# Patient Record
Sex: FEMALE | Race: WHITE
[De-identification: names, ages, dates, MRNs, and addresses within clinical notes are randomized per-mention and may not be internally consistent; named-entity substitution may affect disease eponyms.]

---

## 2020-10-05 ENCOUNTER — HOSPITAL ENCOUNTER (EMERGENCY)
Dept: HOSPITAL 60 - LB.ED | Age: 60
Discharge: HOME | End: 2020-10-05
Payer: COMMERCIAL

## 2020-10-05 DIAGNOSIS — S61.512A: Primary | ICD-10-CM

## 2020-10-05 DIAGNOSIS — Z88.5: ICD-10-CM
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
## 2020-10-05 DIAGNOSIS — Z79.899: ICD-10-CM

## 2020-10-05 DIAGNOSIS — Z23: ICD-10-CM
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
## 2020-10-05 NOTE — EDM.PDOC
ED HPI GENERAL MEDICAL PROBLEM





- General


Stated Complaint: LACERATION


Time Seen by Provider: 10/05/20 12:00


Source of Information: Reports: Patient





- History of Present Illness


INITIAL COMMENTS - FREE TEXT/NARRATIVE: 





Patient is a 59 y/o female who presents with laceration to her left wrist.  She 

was using a  at work and accidentally cut herself.  Bleeding 

controlled prior to arrival to the ED.  Last tetanus immunization was in 2014.  





- Related Data


                                    Allergies











Allergy/AdvReac Type Severity Reaction Status Date / Time


 


codeine Allergy  Facial Verified 10/08/18 12:26





   Swelling  











Home Meds: 


                                    Home Meds





Calcium Citrate 200 mg PO DAILY 10/17/13 [History]


Ibuprofen/Pseudoephedrine HCl [Advil Cold & Sinus Caplet] 200 mg PO ASDIRECTED 

12/21/14 [History]











ED ROS GENERAL





- Review of Systems


Review Of Systems: See Below


Constitutional: Reports: No Symptoms


HEENT: Reports: No Symptoms


Musculoskeletal: Reports: No Symptoms


Skin: Reports: Wound





ED EXAM, SKIN/RASH


Exam: See Below


Exam Limited By: No Limitations


General Appearance: Alert, No Apparent Distress


Head: Atraumatic, Normocephalic


Respiratory/Chest: No Respiratory Distress, No Accessory Muscle Use


Extremities: Normal Inspection, Normal Range of Motion, Normal Capillary Refill


Neurological: Alert, Oriented


Skin: Warm, Dry, Wound/Incision, Other (2.0 cm gaping, subcutaneous laceration 

to left wrist)





ED SKIN PROCEDURES





- Laceration/Wound Repair


  ** Left Ventral Wrist


Appearance: Subcutaneous


Distal NVT: Neuro & Vascular Intact, No Tendon Injury


Anesthetic Type: Local


Local Anesthesia - Lidocaine (Xylocaine): 1% Plain


Local Anesthetic Volume: 3cc


Skin Prep: Chlorhexidine (Hibiciens), Saline


Exploration/Debridement/Repair: Wound Explored, No Foreign Material Found


Suture Size: 4-0


# of Sutures: 3


Repaired with: Other (ethilon)


Drain Placement: No


Sterile Dressing Applied: Nurse


Tetanus Status Addressed: Yes


Complications: No





Course





- Vital Signs


Last Recorded V/S: 





                                Last Vital Signs











Temp  36.4 C   10/05/20 12:05


 


Pulse  71   10/05/20 12:05


 


Resp  18   10/05/20 12:05


 


BP  149/88 H  10/05/20 12:05


 


Pulse Ox  99   10/05/20 12:05














- Orders/Labs/Meds


Orders: 





                               Active Orders 24 hr











 Category Date Time Status


 


 Vaccines to be Administered [RC] PER UNIT ROUTINE Care  10/05/20 12:20 Ordered











Meds: 





Medications














Discontinued Medications














Generic Name Dose Route Start Last Admin





  Trade Name Leno  PRN Reason Stop Dose Admin


 


Diphtheria/Tetanus/Acell Pertussis  0.5 ml  10/05/20 12:20 





  Boostrix  IM  10/05/20 12:21 





  .ONCE ONE  














Departure





- Departure


Time of Disposition: 12:00


Disposition: Home, Self-Care 01


Clinical Impression: 


 Laceration








- Discharge Information


*PRESCRIPTION DRUG MONITORING PROGRAM REVIEWED*: Not Applicable


*COPY OF PRESCRIPTION DRUG MONITORING REPORT IN PATIENT MAURO: Not Applicable





Sepsis Event Note (ED)





- Evaluation


Sepsis Screening Result: No Definite Risk





- Focused Exam


Vital Signs: 





                                   Vital Signs











  Temp Pulse Resp BP Pulse Ox


 


 10/05/20 12:05  36.4 C  71  18  149/88 H  99














- My Orders


Last 24 Hours: 





My Active Orders





10/05/20 12:20


Vaccines to be Administered [RC] PER UNIT ROUTINE 














- Assessment/Plan


Last 24 Hours: 





My Active Orders





10/05/20 12:20


Vaccines to be Administered [RC] PER UNIT ROUTINE

## 2021-04-18 ENCOUNTER — HOSPITAL ENCOUNTER (EMERGENCY)
Dept: HOSPITAL 60 - LB.ED | Age: 61
Discharge: NURSING, REHAB OR LONG-TERM CARE | End: 2021-04-18
Payer: COMMERCIAL

## 2021-04-18 DIAGNOSIS — N30.01: ICD-10-CM

## 2021-04-18 DIAGNOSIS — Z88.5: ICD-10-CM

## 2021-04-18 DIAGNOSIS — K35.80: Primary | ICD-10-CM

## 2021-04-18 DIAGNOSIS — Z79.899: ICD-10-CM

## 2021-04-18 PROCEDURE — A0429 BLS-EMERGENCY: HCPCS

## 2021-04-18 PROCEDURE — U0002 COVID-19 LAB TEST NON-CDC: HCPCS

## 2021-04-18 PROCEDURE — A0425 GROUND MILEAGE: HCPCS

## 2021-04-18 NOTE — EDM.PDOC
ED HPI GENERAL MEDICAL PROBLEM





- General


Chief Complaint: Abdominal Pain


Stated Complaint: abd pain


Time Seen by Provider: 04/18/21 19:10


Source of Information: Reports: Patient


History Limitations: Reports: No Limitations





- History of Present Illness


INITIAL COMMENTS - FREE TEXT/NARRATIVE: 





Ms. Nieto is a 60 YOF here for diffuse abdominal pain and nausea that began 

this am. She states it started in the chest and has moved down into her lower 

abdomen. She has not taken anything for her symptoms. No history of choroidal 

melanoma. Abd is soft, she is not guarding, no rebound tenderness. No vomiting. 

Normal HRR, lungs are CTA, no fever or rash noted. 


Onset: Today


Onset Date: 04/18/21


Onset Time: 10:00


Duration: Hour(s):, Constant


Location: Reports: Abdomen


Quality: Reports: Ache


Improves with: Reports: None


Worsens with: Reports: None


Associated Symptoms: Reports: No Other Symptoms


  ** Abdominal


Pain Score (Numeric/FACES): 8





- Related Data


                                    Allergies











Allergy/AdvReac Type Severity Reaction Status Date / Time


 


codeine Allergy  Facial Verified 04/18/21 19:14





   Swelling  











Home Meds: 


                                    Home Meds





Calcium Citrate 200 mg PO DAILY 10/17/13 [History]


Ibuprofen/Pseudoephedrine HCl [Advil Cold & Sinus Caplet] 200 mg PO ASDIRECTED 

12/21/14 [History]


Cyclobenzaprine [Flexeril] 10 mg PO BEDTIME 04/18/21 [History]


Escitalopram Oxalate [Lexapro] 20 mg PO DAILY 04/18/21 [History]











ED ROS GENERAL





- Review of Systems


Review Of Systems: See Below


GI/Abdominal: Reports: Abdominal Pain, Nausea





ED EXAM, GI/ABD





- Physical Exam


Exam: See Below


Exam Limited By: No Limitations


General Appearance: Alert, Anxious, Mild Distress


Ears: Normal External Exam, Normal Canal


Nose: Normal Inspection, Normal Mucosa


Throat/Mouth: Normal Inspection, Normal Lips, Normal Teeth, Normal Oropharynx


Head: Atraumatic, Normocephalic


Neck: Normal Inspection, Supple, Non-Tender


Respiratory/Chest: No Respiratory Distress, Lungs Clear, Normal Breath Sounds


Cardiovascular: Normal Peripheral Pulses, Regular Rate, Rhythm, No Edema


GI/Abdominal Exam: Normal Bowel Sounds, Soft, No Distention


 (Female) Exam: Deferred


Rectal (Female) Exam: Deferred


Back Exam: Normal Inspection


Extremities: Normal Inspection, Normal Range of Motion, Non-Tender


Neurological: Alert, Oriented, CN II-XII Intact, Normal Cognition


Psychiatric: Normal Affect, Anxious


Skin Exam: Warm, Dry, Intact, Normal Color


Lymphatic: No Adenopathy


  ** #1 Interpretation


EKG Date: 04/18/21


Time: 18:58


Rhythm: NSR


Rate (Beats/Min): 88


Axis: Normal


P-Wave: Present


QRS: Normal


ST-T: Normal


QT: Normal


Comparison: NA - No Prior EKG


EKG Interpretation Comments: 





Normal sinus rhythm 





Course





- Vital Signs


Last Recorded V/S: 


                                Last Vital Signs











Temp  37.1 C   04/18/21 18:47


 


Pulse  91   04/18/21 19:05


 


Resp  18   04/18/21 19:05


 


BP  162/103 H  04/18/21 19:05


 


Pulse Ox  98   04/18/21 19:05














- Orders/Labs/Meds


Orders: 


                               Active Orders 24 hr











 Category Date Time Status


 


 EKG Documentation Completion [RC] ASDIRECTED Care  04/18/21 19:00 Active


 


 Abdomen Pelvis w Cont [CT] Stat Exams  04/18/21 18:20 Taken


 


 CORONAVIRUS COVID-19 KARIN [MOLEC] Stat Lab  04/18/21 20:24 Received


 


 CULTURE URINE [RM] Stat Lab  04/18/21 20:21 Received


 


 Iopamidol [Isovue-300 (61%)] Med  04/18/21 19:15 Active





 100 ml IV .AS DIRECTED   


 


 Levofloxacin/Dextrose 5%-Water [Levaquin in D5W 750 MG/ Med  04/18/21 19:46 

Active





 150 ML] 750 mg   





 Levofloxacin/Dextrose 5%-Water [Levaquin in D5W 750 MG/   





 150 ML] 150 ml   





 IV ONETIME   


 


 Phenazopyridine [Pyridium] Med  04/18/21 20:18 Active





 200 mg PO BID PRN   


 


 Sodium Chloride 0.9% [Normal Saline] 1,000 ml Med  04/18/21 18:23 Active





 IV .BOLUS   








                                Medication Orders





Sodium Chloride (Normal Saline)  1,000 mls @ 999 drops/hr IV .BOLUS ONE


   Stop: 04/19/21 09:23


   Last Admin: 04/18/21 19:19  Dose: 999 drops/hr


   Documented by: THOR


Levofloxacin/Dextrose 750 mg/ (Levofloxacin/Dextrose)  300 mls @ 100 mls/hr IV 

ONETIME ONE


   Stop: 04/18/21 22:45


   Last Admin: 04/18/21 19:50  Dose: 100 mls/hr


   Documented by: THOR


Iopamidol (Iopamidol 612 Mg/Ml 100 Ml Bottle)  100 ml IV .AS DIRECTED BARBRA


   Last Admin: 04/18/21 19:29  Dose: 100 ml


   Documented by: RACHEL


Phenazopyridine HCl (Phenazopyridine 100 Mg Tab)  200 mg PO BID PRN


   PRN Reason: Abdominal Pain


   Last Admin: 04/18/21 20:42  Dose: 200 mg


   Documented by: THOR








Labs: 


                                Laboratory Tests











  04/18/21 04/18/21 04/18/21 Range/Units





  19:00 19:00 19:00 


 


WBC  12.9 H D    (4.0-11.0)  K/uL


 


RBC  4.33    (3.80-5.80)  M/uL


 


Hgb  13.5    (11.5-16.5)  g/dL


 


Hct  39.4    (37.0-47.0)  %


 


MCV  91    (76-96)  fL


 


MCH  31.2    (27.0-32.0)  pg


 


MCHC  34.3    (31.0-35.0)  g/dL


 


RDW  11.7    (11.0-16.0)  %


 


Plt Count  200    (150-500)  K/uL


 


MPV  8.8    (6.0-10.0)  fL


 


Neut % (Auto)  80.6 H    (45.0-70.0)  %


 


Lymph % (Auto)  10.1 L    (20.0-40.0)  %


 


Mono % (Auto)  8.1    (3.0-10.0)  %


 


Eos % (Auto)  1.0    (1.0-5.0)  %


 


Baso % (Auto)  0.2    (0.0-0.5)  %


 


Neut # (Auto)  10.42 H    (2.00-7.50)  K/uL


 


Lymph # (Auto)  1.30 L    (1.50-4.00)  K/uL


 


Mono # (Auto)  1.05 H    (0.20-0.80)  K/uL


 


Eos # (Auto)  0.13    (0.04-0.40)  K/uL


 


Baso # (Auto)  0.02    (0.02-0.10)  K/uL


 


Sodium   138   (136-145)  mmol/L


 


Potassium   4.8   (3.5-5.1)  mmol/L


 


Chloride   103   ()  mmol/L


 


Carbon Dioxide   24.5   (21.0-32.0)  mmol/L


 


Anion Gap   15.3 H   (5.0-15.0)  mmol/L


 


BUN   13  D   (8-26)  mg/dL


 


Creatinine   0.78   (0.55-1.02)  mg/dL


 


Est Cr Clr Drug Dosing   TNP   


 


Estimated GFR (MDRD)   > 60   (>60)  MLS/MIN


 


BUN/Creatinine Ratio   16.7   (6-25)  


 


Glucose   135 H D   ()  mg/dL


 


Calcium   8.7   (8.5-10.1)  mg/dL


 


Total Bilirubin   0.7   (0.0-1.0)  mg/dL


 


AST   14 L   (15-37)  U/L


 


ALT   23   (12-78)  U/L


 


Alkaline Phosphatase   82   ()  U/L


 


Troponin I    < 0.017  (0.000-0.060)  ng/mL


 


Total Protein   7.5   (6.4-8.2)  g/dL


 


Albumin   4.1   (3.4-5.0)  g/dL


 


Globulin   3.4   (2.2-4.2)  g/dL


 


Albumin/Globulin Ratio   1.2   (0.8-2.0)  


 


Urine Color     


 


Urine Appearance     (CLEAR)  


 


Urine pH     (5.0-8.0)  


 


Ur Specific Gravity     (1.003-1.030)  


 


Urine Protein     (NEGATIVE)  mg/dL


 


Urine Glucose (UA)     (NEGATIVE)  mg/dL


 


Urine Ketones     (NEGATIVE)  mg/dL


 


Urine Occult Blood     (NEGATIVE)  


 


Urine Nitrite     (NEGATIVE)  


 


Urine Bilirubin     (NEGATIVE)  


 


Urine Urobilinogen     (0.2-1.0)  E.U./dL


 


Ur Leukocyte Esterase     (NEGATIVE)  


 


Urine RBC     /HPF


 


Urine WBC     /HPF


 


Urine WBC Clumps     /HPF


 


Ur Squamous Epith Cells     /HPF


 


Urine Bacteria     /HPF














  04/18/21 Range/Units





  19:07 


 


WBC   (4.0-11.0)  K/uL


 


RBC   (3.80-5.80)  M/uL


 


Hgb   (11.5-16.5)  g/dL


 


Hct   (37.0-47.0)  %


 


MCV   (76-96)  fL


 


MCH   (27.0-32.0)  pg


 


MCHC   (31.0-35.0)  g/dL


 


RDW   (11.0-16.0)  %


 


Plt Count   (150-500)  K/uL


 


MPV   (6.0-10.0)  fL


 


Neut % (Auto)   (45.0-70.0)  %


 


Lymph % (Auto)   (20.0-40.0)  %


 


Mono % (Auto)   (3.0-10.0)  %


 


Eos % (Auto)   (1.0-5.0)  %


 


Baso % (Auto)   (0.0-0.5)  %


 


Neut # (Auto)   (2.00-7.50)  K/uL


 


Lymph # (Auto)   (1.50-4.00)  K/uL


 


Mono # (Auto)   (0.20-0.80)  K/uL


 


Eos # (Auto)   (0.04-0.40)  K/uL


 


Baso # (Auto)   (0.02-0.10)  K/uL


 


Sodium   (136-145)  mmol/L


 


Potassium   (3.5-5.1)  mmol/L


 


Chloride   ()  mmol/L


 


Carbon Dioxide   (21.0-32.0)  mmol/L


 


Anion Gap   (5.0-15.0)  mmol/L


 


BUN   (8-26)  mg/dL


 


Creatinine   (0.55-1.02)  mg/dL


 


Est Cr Clr Drug Dosing   


 


Estimated GFR (MDRD)   (>60)  MLS/MIN


 


BUN/Creatinine Ratio   (6-25)  


 


Glucose   ()  mg/dL


 


Calcium   (8.5-10.1)  mg/dL


 


Total Bilirubin   (0.0-1.0)  mg/dL


 


AST   (15-37)  U/L


 


ALT   (12-78)  U/L


 


Alkaline Phosphatase   ()  U/L


 


Troponin I   (0.000-0.060)  ng/mL


 


Total Protein   (6.4-8.2)  g/dL


 


Albumin   (3.4-5.0)  g/dL


 


Globulin   (2.2-4.2)  g/dL


 


Albumin/Globulin Ratio   (0.8-2.0)  


 


Urine Color  Yellow  


 


Urine Appearance  Cloudy  (CLEAR)  


 


Urine pH  6.0  (5.0-8.0)  


 


Ur Specific Gravity  1.025  (1.003-1.030)  


 


Urine Protein  Negative  (NEGATIVE)  mg/dL


 


Urine Glucose (UA)  Negative  (NEGATIVE)  mg/dL


 


Urine Ketones  40 H  (NEGATIVE)  mg/dL


 


Urine Occult Blood  Trace-lysed H  (NEGATIVE)  


 


Urine Nitrite  Positive H  (NEGATIVE)  


 


Urine Bilirubin  Negative  (NEGATIVE)  


 


Urine Urobilinogen  0.2  (0.2-1.0)  E.U./dL


 


Ur Leukocyte Esterase  Small H  (NEGATIVE)  


 


Urine RBC  0-5 H  /HPF


 


Urine WBC  50-75 H  /HPF


 


Urine WBC Clumps  Few  /HPF


 


Ur Squamous Epith Cells  Few  /HPF


 


Urine Bacteria  Many H  /HPF











Meds: 


Medications











Generic Name Dose Route Start Last Admin





  Trade Name Leno  PRN Reason Stop Dose Admin


 


Sodium Chloride  1,000 mls @ 999 drops/hr  04/18/21 18:23  04/18/21 19:19





  Normal Saline  IV  04/19/21 09:23  999 drops/hr





  .BOLUS ONE   Administration


 


Levofloxacin/Dextrose 750 mg/  300 mls @ 100 mls/hr  04/18/21 19:46  04/18/21 

19:50





  Levofloxacin/Dextrose  IV  04/18/21 22:45  100 mls/hr





  ONETIME ONE   Administration


 


Iopamidol  100 ml  04/18/21 19:15  04/18/21 19:29





  Iopamidol 612 Mg/Ml 100 Ml Bottle  IV   100 ml





  .AS DIRECTED BARBRA   Administration


 


Phenazopyridine HCl  200 mg  04/18/21 20:18  04/18/21 20:42





  Phenazopyridine 100 Mg Tab  PO   200 mg





  BID PRN   Administration





  Abdominal Pain  














Discontinued Medications














Generic Name Dose Route Start Last Admin





  Trade Name Leno  PRN Reason Stop Dose Admin


 


Fentanyl  12.5 mcg  04/18/21 19:23  04/18/21 19:29





  Fentanyl 100 Mcg/2 Ml Sdv  IVPUSH  04/18/21 19:24  12.5 mcg





  ONETIME ONE   Administration


 


Hydromorphone HCl  1 mg  04/18/21 20:47 





  Hydromorphone 2 Mg/Ml Sdv  IV  04/18/21 20:48 





  ONETIME ONE  


 


Levofloxacin/Dextrose  Confirm  04/18/21 19:37  04/18/21 20:08





  Levaquin In D5w 750 Mg/150 Ml  Administered  04/18/21 19:38  Not Given





  Dose  





  150 mls @ as directed  





  IV  





  .STK-MED ONE  


 


Sodium Chloride  50 ml  04/18/21 19:10  04/18/21 19:28





  Sodium Chloride 0.9% 50 Ml Sdv  FLUSH  04/18/21 19:11  50 ml





  ONETIME ONE   Administration














Departure





- Departure


Time of Disposition: 21:00


Disposition: DC/Tfer to Acute Hospital [facility]


Condition: Good


Clinical Impression: 


UTI (urinary tract infection)


Qualifiers:


 Urinary tract infection type: acute cystitis Hematuria presence: with hematuria

Qualified Code(s): N30.01 - Acute cystitis with hematuria





Acute appendicitis


Qualifiers:


 Acute appendicitis type: unspecified acute appendicitis type Qualified Code(s):

K35.80 - Unspecified acute appendicitis





Appendicitis


Qualifiers:


 Appendicitis type: acute appendicitis Acute appendicitis type: unspecified 

acute appendicitis type Qualified Code(s): K35.80 - Unspecified acute 

appendicitis








- Discharge Information


*PRESCRIPTION DRUG MONITORING PROGRAM REVIEWED*: Not Applicable


*COPY OF PRESCRIPTION DRUG MONITORING REPORT IN PATIENT MAURO: Not Applicable


Instructions:  Urinary Tract Infection, Adult, Easy-to-Read


Referrals: 


PCP,None [Primary Care Provider] - 


Forms:  ED Department Discharge


Care Plan Goals: 


Transfer to [facility] for appendectomy with General Surgery, discussed care plan 

with Dr. Peters and Dr. Ellis. Transfer from [facility] with ACLS crew. 





Sepsis Event Note (ED)





- Focused Exam


Vital Signs: 


                                   Vital Signs











  Temp Pulse Resp BP Pulse Ox


 


 04/18/21 19:05   91  18  162/103 H  98


 


 04/18/21 18:47  37.1 C  90  18  165/103 H  99














- My Orders


Last 24 Hours: 


My Active Orders





04/18/21 18:20


Abdomen Pelvis w Cont [CT] Stat 





04/18/21 18:23


Sodium Chloride 0.9% [Normal Saline] 1,000 ml IV .BOLUS 





04/18/21 19:00


EKG Documentation Completion [RC] ASDIRECTED 





04/18/21 19:15


Iopamidol [Isovue-300 (61%)]   100 ml IV .AS DIRECTED 





04/18/21 19:46


Levofloxacin/Dextrose 5%-Water [Levaquin in D5W 750 MG/150 ML] 750 mg   

Levofloxacin/Dextrose 5%-Water [Levaquin in D5W 750 MG/150 ML] 150 ml IV ONETIME







04/18/21 20:18


Phenazopyridine [Pyridium]   200 mg PO BID PRN 





04/18/21 20:21


CULTURE URINE [RM] Stat 





04/18/21 20:24


CORONAVIRUS COVID-19 KARIN [MOLEC] Stat 














- Assessment/Plan


Last 24 Hours: 


My Active Orders





04/18/21 18:20


Abdomen Pelvis w Cont [CT] Stat 





04/18/21 18:23


Sodium Chloride 0.9% [Normal Saline] 1,000 ml IV .BOLUS 





04/18/21 19:00


EKG Documentation Completion [RC] ASDIRECTED 





04/18/21 19:15


Iopamidol [Isovue-300 (61%)]   100 ml IV .AS DIRECTED 





04/18/21 19:46


Levofloxacin/Dextrose 5%-Water [Levaquin in D5W 750 MG/150 ML] 750 mg   

Levofloxacin/Dextrose 5%-Water [Levaquin in D5W 750 MG/150 ML] 150 ml IV ONETIME







04/18/21 20:18


Phenazopyridine [Pyridium]   200 mg PO BID PRN 





04/18/21 20:21


CULTURE URINE [RM] Stat 





04/18/21 20:24


CORONAVIRUS COVID-19 KARIN [MOLEC] Stat 











Assessment:: 





UTI, Acute Appendicitis. 


Plan: 





Transfer to [facility] for appendectomy with General Surgery, discussed care plan 

with Dr. Peters and Dr. Ellis.

## 2021-04-19 NOTE — CT
Date of Service:  04/18/21

Clinical Data:  pelvic, abdominal pain. 



ENHANCED ABDOMEN AND PELVIC CT:  



Multislice acquisition through the abdomen and pelvis with IV, but without oral 
contrast was performed.  



Comparison is made to a prior unenhanced and enhanced abdomen and pelvic CT 
dated 12/16/13.



There are atelectatic changes in dependent portion of both lower lungs.  The 
lung bases are otherwise clear.  



There is mild diffuse fatty infiltration of the liver.  No focal hepatic 
lesions.  The gallbladder appears normal.  No biliary duct dilatation.  



The spleen appears normal.  The pancreas appears normal.  The right and left 
adrenals appear normal.  



The right and left kidneys appear normal and enhance symmetrically.  No 
hydronephrosis or hydroureter 



The bladder is partially fluid filled.  There is a pair of diffuse bladder wall 
thickening.  This is probably related to nondistention  Cystitis should be 
considered.  



There is gastric wall thickening in the antrum of the stomach.  This is probably
related to nondistention.  Gastritis or an infiltrating process should at least 
be considered.  



The appendix is abnormal.  It is dilated and measures 13 mm in diameter.  There 
is mild periappendiceal fat stranding.  These findings are consistent with 
appendicitis.  No evidence of periappendiceal abscess.  



There is a large amount of stool noted within the cecum and ascending colon.  
There is a moderate amount of gas and stool present throughout the transverse 
and descending colon.  There is diverticulosis of the sigmoid colon.  No 
evidence of diverticulitis.  



No free air.  No free fluid.  No dilated loops of bowel.  No adenopathy.  No 
aortic aneurysm or dissection.  



No other significant findings.  



IMPRESSION:  Abnormal appendix consistent with appendicitis.  The patient's 
physician was notified of the findings by telephone and by [facility] 
preliminary radiology report.  



Multiple other findings as discussed above.  



[id number]

[facility]